# Patient Record
Sex: MALE | Race: WHITE | Employment: FULL TIME | URBAN - METROPOLITAN AREA
[De-identification: names, ages, dates, MRNs, and addresses within clinical notes are randomized per-mention and may not be internally consistent; named-entity substitution may affect disease eponyms.]

---

## 2017-10-13 ENCOUNTER — HOSPITAL ENCOUNTER (INPATIENT)
Facility: HOSPITAL | Age: 47
LOS: 3 days | Discharge: HOME | DRG: 638 | End: 2017-10-16
Attending: EMERGENCY MEDICINE | Admitting: INTERNAL MEDICINE
Payer: COMMERCIAL

## 2017-10-13 ENCOUNTER — APPOINTMENT (OUTPATIENT)
Dept: GENERAL RADIOLOGY | Facility: HOSPITAL | Age: 47
DRG: 638 | End: 2017-10-13
Attending: EMERGENCY MEDICINE
Payer: COMMERCIAL

## 2017-10-13 DIAGNOSIS — Z79.4 TYPE 2 DIABETES MELLITUS WITH HYPERGLYCEMIA, WITH LONG-TERM CURRENT USE OF INSULIN (HCC): ICD-10-CM

## 2017-10-13 DIAGNOSIS — R73.9 HYPERGLYCEMIA: Primary | ICD-10-CM

## 2017-10-13 DIAGNOSIS — J06.9 VIRAL URI: ICD-10-CM

## 2017-10-13 DIAGNOSIS — R50.9 FEVER, UNSPECIFIED FEVER CAUSE: ICD-10-CM

## 2017-10-13 DIAGNOSIS — E11.65 TYPE 2 DIABETES MELLITUS WITH HYPERGLYCEMIA, WITH LONG-TERM CURRENT USE OF INSULIN (HCC): ICD-10-CM

## 2017-10-13 PROBLEM — N17.9 ACUTE KIDNEY INJURY (HCC): Status: ACTIVE | Noted: 2017-10-13

## 2017-10-13 PROBLEM — E87.1 HYPONATREMIA: Status: ACTIVE | Noted: 2017-10-13

## 2017-10-13 PROCEDURE — 86788 WEST NILE VIRUS AB IGM: CPT | Performed by: FAMILY MEDICINE

## 2017-10-13 PROCEDURE — 82962 GLUCOSE BLOOD TEST: CPT

## 2017-10-13 PROCEDURE — 93010 ELECTROCARDIOGRAM REPORT: CPT | Performed by: INTERNAL MEDICINE

## 2017-10-13 PROCEDURE — 93005 ELECTROCARDIOGRAM TRACING: CPT

## 2017-10-13 PROCEDURE — 81001 URINALYSIS AUTO W/SCOPE: CPT | Performed by: EMERGENCY MEDICINE

## 2017-10-13 PROCEDURE — 96367 TX/PROPH/DG ADDL SEQ IV INF: CPT

## 2017-10-13 PROCEDURE — 80053 COMPREHEN METABOLIC PANEL: CPT | Performed by: EMERGENCY MEDICINE

## 2017-10-13 PROCEDURE — 87486 CHLMYD PNEUM DNA AMP PROBE: CPT | Performed by: EMERGENCY MEDICINE

## 2017-10-13 PROCEDURE — 71010 XR CHEST AP PORTABLE  (CPT=71010): CPT | Performed by: EMERGENCY MEDICINE

## 2017-10-13 PROCEDURE — 87086 URINE CULTURE/COLONY COUNT: CPT | Performed by: EMERGENCY MEDICINE

## 2017-10-13 PROCEDURE — 99291 CRITICAL CARE FIRST HOUR: CPT

## 2017-10-13 PROCEDURE — 99285 EMERGENCY DEPT VISIT HI MDM: CPT

## 2017-10-13 PROCEDURE — 87798 DETECT AGENT NOS DNA AMP: CPT | Performed by: EMERGENCY MEDICINE

## 2017-10-13 PROCEDURE — 83605 ASSAY OF LACTIC ACID: CPT | Performed by: EMERGENCY MEDICINE

## 2017-10-13 PROCEDURE — 96365 THER/PROPH/DIAG IV INF INIT: CPT

## 2017-10-13 PROCEDURE — 36415 COLL VENOUS BLD VENIPUNCTURE: CPT

## 2017-10-13 PROCEDURE — 87040 BLOOD CULTURE FOR BACTERIA: CPT | Performed by: EMERGENCY MEDICINE

## 2017-10-13 PROCEDURE — 87581 M.PNEUMON DNA AMP PROBE: CPT | Performed by: EMERGENCY MEDICINE

## 2017-10-13 PROCEDURE — 86789 WEST NILE VIRUS ANTIBODY: CPT | Performed by: FAMILY MEDICINE

## 2017-10-13 PROCEDURE — 82009 KETONE BODYS QUAL: CPT | Performed by: NURSE PRACTITIONER

## 2017-10-13 PROCEDURE — 87999 UNLISTED MICROBIOLOGY PX: CPT

## 2017-10-13 PROCEDURE — 87633 RESP VIRUS 12-25 TARGETS: CPT | Performed by: EMERGENCY MEDICINE

## 2017-10-13 PROCEDURE — 83036 HEMOGLOBIN GLYCOSYLATED A1C: CPT | Performed by: INTERNAL MEDICINE

## 2017-10-13 RX ORDER — ACETAMINOPHEN 325 MG/1
650 TABLET ORAL EVERY 6 HOURS PRN
Status: DISCONTINUED | OUTPATIENT
Start: 2017-10-13 | End: 2017-10-16

## 2017-10-13 RX ORDER — ONDANSETRON 2 MG/ML
4 INJECTION INTRAMUSCULAR; INTRAVENOUS EVERY 6 HOURS PRN
Status: DISCONTINUED | OUTPATIENT
Start: 2017-10-13 | End: 2017-10-16

## 2017-10-13 RX ORDER — SODIUM PHOSPHATE, DIBASIC AND SODIUM PHOSPHATE, MONOBASIC 7; 19 G/133ML; G/133ML
1 ENEMA RECTAL ONCE AS NEEDED
Status: DISCONTINUED | OUTPATIENT
Start: 2017-10-13 | End: 2017-10-16

## 2017-10-13 RX ORDER — HEPARIN SODIUM 5000 [USP'U]/ML
5000 INJECTION, SOLUTION INTRAVENOUS; SUBCUTANEOUS EVERY 8 HOURS SCHEDULED
Status: DISCONTINUED | OUTPATIENT
Start: 2017-10-13 | End: 2017-10-16

## 2017-10-13 RX ORDER — SODIUM CHLORIDE 9 MG/ML
INJECTION, SOLUTION INTRAVENOUS CONTINUOUS
Status: DISCONTINUED | OUTPATIENT
Start: 2017-10-13 | End: 2017-10-14

## 2017-10-13 RX ORDER — POLYETHYLENE GLYCOL 3350 17 G/17G
17 POWDER, FOR SOLUTION ORAL DAILY PRN
Status: DISCONTINUED | OUTPATIENT
Start: 2017-10-13 | End: 2017-10-16

## 2017-10-13 RX ORDER — BISACODYL 10 MG
10 SUPPOSITORY, RECTAL RECTAL
Status: DISCONTINUED | OUTPATIENT
Start: 2017-10-13 | End: 2017-10-16

## 2017-10-13 RX ORDER — DEXTROSE MONOHYDRATE 25 G/50ML
50 INJECTION, SOLUTION INTRAVENOUS
Status: DISCONTINUED | OUTPATIENT
Start: 2017-10-13 | End: 2017-10-16

## 2017-10-13 RX ORDER — DOCUSATE SODIUM 100 MG/1
100 CAPSULE, LIQUID FILLED ORAL 2 TIMES DAILY
Status: DISCONTINUED | OUTPATIENT
Start: 2017-10-13 | End: 2017-10-16

## 2017-10-13 RX ORDER — DEXTROSE AND SODIUM CHLORIDE 5; .45 G/100ML; G/100ML
100 INJECTION, SOLUTION INTRAVENOUS CONTINUOUS PRN
Status: DISCONTINUED | OUTPATIENT
Start: 2017-10-13 | End: 2017-10-14

## 2017-10-14 PROCEDURE — 87081 CULTURE SCREEN ONLY: CPT | Performed by: INTERNAL MEDICINE

## 2017-10-14 PROCEDURE — 83516 IMMUNOASSAY NONANTIBODY: CPT | Performed by: INTERNAL MEDICINE

## 2017-10-14 PROCEDURE — 82962 GLUCOSE BLOOD TEST: CPT

## 2017-10-14 PROCEDURE — 83605 ASSAY OF LACTIC ACID: CPT | Performed by: EMERGENCY MEDICINE

## 2017-10-14 PROCEDURE — 80048 BASIC METABOLIC PNL TOTAL CA: CPT | Performed by: HOSPITALIST

## 2017-10-14 PROCEDURE — 84681 ASSAY OF C-PEPTIDE: CPT | Performed by: INTERNAL MEDICINE

## 2017-10-14 PROCEDURE — 87493 C DIFF AMPLIFIED PROBE: CPT | Performed by: INTERNAL MEDICINE

## 2017-10-14 PROCEDURE — 84132 ASSAY OF SERUM POTASSIUM: CPT | Performed by: INTERNAL MEDICINE

## 2017-10-14 PROCEDURE — 84145 PROCALCITONIN (PCT): CPT | Performed by: NURSE PRACTITIONER

## 2017-10-14 PROCEDURE — 85027 COMPLETE CBC AUTOMATED: CPT | Performed by: HOSPITALIST

## 2017-10-14 RX ORDER — SODIUM CHLORIDE 450 MG/100ML
INJECTION, SOLUTION INTRAVENOUS CONTINUOUS
Status: DISCONTINUED | OUTPATIENT
Start: 2017-10-14 | End: 2017-10-16

## 2017-10-14 RX ORDER — POTASSIUM CHLORIDE 20 MEQ/1
40 TABLET, EXTENDED RELEASE ORAL EVERY 4 HOURS
Status: COMPLETED | OUTPATIENT
Start: 2017-10-14 | End: 2017-10-14

## 2017-10-14 RX ORDER — FAMOTIDINE 20 MG/1
20 TABLET ORAL 2 TIMES DAILY
Status: DISCONTINUED | OUTPATIENT
Start: 2017-10-14 | End: 2017-10-16

## 2017-10-14 NOTE — ED PROVIDER NOTES
Patient Seen in: [facility] Emergency Department    History   Patient presents with:  Abnormal Result (metabolic, cardiac)    Stated Complaint: abnormal blood results    HPI    Patient is a 67-year-old male presents for further evaluation in the wil stated in HPI.     Physical Exam   ED Triage Vitals [10/13/17 2035]  BP: 151/89  Pulse: 102  Resp: 18  Temp: 99.8 °F (37.7 °C)  Temp src: Temporal  SpO2: n/a  O2 Device: n/a    Current:/89   Pulse 102   Temp 99.8 °F (37.7 °C) (Temporal)   Resp 18   Ht viral process. Flu panel was expanded viruses was sent. Does have pyuria. Urine blood was sent for culture. Chemistry reviewed. Glucose 791. Creatinine 1.8. Sodium 244. Patient was hydrated with normal saline. He was put on an insulin drip.   He wi

## 2017-10-14 NOTE — PLAN OF CARE
Received pt this am aox4, low grade fever this am, monitoring. Pt received breakfast tray this am, also on insulin gtt. Will discuss with endo.      Family at bedside this am.     GASTROINTESTINAL - ADULT    • Maintains or returns to baseline bowel func

## 2017-10-14 NOTE — PROGRESS NOTES
ICU  Critical Care APN Progress Note    NAME: Mariya Louis - ROOM: 009/639-U - MRN: [id number] - Age: 50year old - :1/10/1970    History Of Present Illness:  Mariya Louis is a 50year old male with PMHx significant for HTN, DM, HLD, adrenal ad Acute kidney injury ([facility] 10/13/2017   • Adrenal adenoma 2011    L adrenal adenoma   • Diabetes mellitus ([facility] 1997   • HYPERLIPIDEMIA    • Hypertension        Social Hx:  Smoking status: Former Smoker insulin infusion & hypoglycemia protocol  - Additional IVF bolus now, and continuous rate after  - A1C, Acetone pending  - no anion gap elevation appreciated    2.  Fever 2/2 likely viral URI v UTI v GI source  - Respiratory viral panel sent  - Blood/Sputum

## 2017-10-14 NOTE — PROGRESS NOTES
Patient arrived on unit. Settled into room. Dinner eaten in ICU prior to arrival. Blood sugars covered per [facility] report.

## 2017-10-14 NOTE — CONSULTS
[facility]    PATIENT'S NAME: Sim Romo   ATTENDING PHYSICIAN: Vaughn Vasquez M.D.   CONSULTING PHYSICIAN: Geno Cedeño M.D.    PATIENT ACCOUNT#:   [de-identified]    LOCATION:  [address] [place] [address]  MEDICAL RECORD #:   [id number]       DATE OF workup, hypertension, hyperlipidemia. He has had back surgery in the past.     MEDICATIONS:  On admission, none, other than Augmentin.   He was on pioglitazone 30 mg at one point, glimepiride 2 mg, and metformin 1000 b.i.d. prior to discontinuing his own m Primary Service. 3.   Hypertension. Management per Primary Service. 4.   Hyperlipidemia, previously on statin. It is likely that he will require this agent at the time of discharge.       Dictated By Santana Zamora M.D.  d: 10/14/2017 12:03:13

## 2017-10-14 NOTE — H&P
.  CC: Patient presents with:  Abnormal Result (metabolic, cardiac)       PCP: Eloise Reynolds MD    History of Present Illness: Patient is a 50year old male with PMH sig for DM, HTN/HL who has most recently not been on diabetes meds, who presented with abn l times daily.  (Patient taking differently: Take 1 tablet by mouth 2 (two) times daily.  ) Disp: 20 tablet Rfl: 0         Soc Hx     Smoking status: Former Smoker  1.50 Packs/day  For 27.00 Years     Types: Cigarettes    Quit date: 10/13/2012    Smokeless to Patient complains of fever, chills, diarrhea. Patient states he was sent here after abnormal blood lab results. FINDINGS:  Lung volumes are upper normal. No new consolidation or pleural effusion. Heart and pulmonary vessels are normal caliber.  Mediastin

## 2017-10-14 NOTE — PROGRESS NOTES
[facility] PULMONARY/CRITICAL CARE CONSULTATION    HPI: Stephan Bell is a 50year old male with PMHx significant for HTN, DM, HLD, adrenal adenoma, who presents to ED today from home for hyperglycemia & elevated BUN/sCr per instructions from his PCP office. Hypertension      Past Surgical History:  2007: BACK SURGERY      Comment: cervical fusion  7/18/2014: EXC BACK LES SC = 3 CM Right      Comment: Procedure: EXCISION OF TRUNK MASS;  Surgeon:                Janeth Herman MD;  Location: [facility] SURGICAL DAILY.       Facility-Administered Medications: None         Current Meds:    Current Facility-Administered Medications:  Potassium Chloride ER (K-DUR M20) CR tab 40 mEq 40 mEq Oral Q4H   0.9%  NaCl infusion  Intravenous Continuous   Heparin Sodium (Porcine Not on file     Other Topics Concern   None on file     Social History Narrative   None on file       History reviewed. No pertinent family history.     ROS: 10 pt ROS negative except what is mentioned in HPI    OBJECTIVE:   10/14/17  0200 10/14/17  0220 10 PGLU 160 10/14/2017     No results for input(s): TROP, CK in the last 72 hours. Imaging -- CXR -- reviewed and visualized -- no acute cardiopulm abn    Assessment and Plan:  1.  Hyperglycemia -- secondary to medication non-compliance with likely acute

## 2017-10-14 NOTE — ED INITIAL ASSESSMENT (HPI)
Fever and malaise since Monday, ST since yesterday. Saw PCP today for strep and mono tests, which patient reports were negative.  Patient reports his blood sugar has been running in the 600s

## 2017-10-14 NOTE — PLAN OF CARE
Patient/Family Goals    • Patient/Family Short Term Goal Completed          Diabetes/Glucose Control    • Glucose maintained within prescribed range Progressing        GASTROINTESTINAL - ADULT    • Minimal or absence of nausea and vomiting Progressing    •

## 2017-10-15 PROCEDURE — 82962 GLUCOSE BLOOD TEST: CPT

## 2017-10-15 PROCEDURE — 84132 ASSAY OF SERUM POTASSIUM: CPT | Performed by: HOSPITALIST

## 2017-10-15 PROCEDURE — 80053 COMPREHEN METABOLIC PANEL: CPT | Performed by: INTERNAL MEDICINE

## 2017-10-15 NOTE — PROGRESS NOTES
[facility]  Progress Note    Berto Jarvis Patient Status:  Inpatient    1/10/1970 MRN [id number]   [facility] 3SW-A Attending Brenton Raman, *   Hosp Day # 2 PCP Dunia Jay MD     Assessment/Plan:  Patient Active Problem (80.7 kg), SpO2 96 %. Intake/Output Summary (Last 24 hours) at 10/15/17 1143  Last data filed at 10/15/17 1132   Gross per 24 hour   Intake             3266 ml   Output             1105 ml   Net             2161 ml       HEENT: Exam is unremarkable.   Ne

## 2017-10-15 NOTE — PROGRESS NOTES
[facility] Hospitalist Progress Note     PCP: Trent Melvin MD    CC:  Follow up    SUBJECTIVE:  Pt sitting up in chair, says he feels great.   No cp/sob/n/v     OBJECTIVE:  Temp:  [97.5 °F (36.4 °C)-102 °F (38.9 °C)] 98.9 °F (37.2 °C)  Pulse:  [59-82] 75  Resp: famoTIDine  20 mg Oral BID   • Insulin Aspart Pen  1-30 Units Subcutaneous TID CC and HS   • Insulin Aspart Pen  1-30 Units Subcutaneous TID CC and HS   • insulin detemir  10 Units Subcutaneous Daily   • insulin detemir  10 Units Subcutaneous Nightly   • H

## 2017-10-16 VITALS
HEIGHT: 66 IN | TEMPERATURE: 99 F | SYSTOLIC BLOOD PRESSURE: 130 MMHG | WEIGHT: 178 LBS | BODY MASS INDEX: 28.61 KG/M2 | DIASTOLIC BLOOD PRESSURE: 75 MMHG | RESPIRATION RATE: 18 BRPM | OXYGEN SATURATION: 97 % | HEART RATE: 74 BPM

## 2017-10-16 PROCEDURE — 82962 GLUCOSE BLOOD TEST: CPT

## 2017-10-16 PROCEDURE — 83735 ASSAY OF MAGNESIUM: CPT | Performed by: HOSPITALIST

## 2017-10-16 PROCEDURE — 80048 BASIC METABOLIC PNL TOTAL CA: CPT | Performed by: HOSPITALIST

## 2017-10-16 RX ORDER — MAGNESIUM OXIDE 400 MG (241.3 MG MAGNESIUM) TABLET
400 TABLET ONCE
Status: COMPLETED | OUTPATIENT
Start: 2017-10-16 | End: 2017-10-16

## 2017-10-16 NOTE — PLAN OF CARE
D/c paperwork given, scripts given and some sent to pharmacy. Pt's questions answered and concerns addressed by RN and diabetes educator. Will D/c home via personal car.

## 2017-10-16 NOTE — PLAN OF CARE
Diabetes/Glucose Control    • Glucose maintained within prescribed range Adequate for Discharge        METABOLIC/FLUID AND ELECTROLYTES - ADULT    • Glucose maintained within prescribed range Adequate for Discharge        PAIN - ADULT    • Verbalizes/displ

## 2017-10-16 NOTE — PLAN OF CARE
Diabetes/Glucose Control    • Glucose maintained within prescribed range Adequate for Discharge        PAIN - ADULT    • Verbalizes/displays adequate comfort level or patient's stated pain goal Adequate for Discharge          GASTROINTESTINAL - ADULT    •

## 2017-10-16 NOTE — DISCHARGE SUMMARY
General Medicine Discharge Summary     Patient ID:  Guy Do  50year old  [id number]  1/10/1970    Admit date: 10/13/2017    Discharge date and time: 10/16/17    Attending Physician: Yadira Plascencia, *     Primary Care Physician: Destini Burgos to f/u if he was not better. He came back in yesterday and labs were checked. His doctor's office informed him that his glc was 688 and he was urged to go to ER. He was started on insulin gtt in ICU. He currently feels much better.   He had lost substantial imaging of lower cervical plate and screw fixation. Mild scoliosis, apex left. CONCLUSION:  No acute cardiopulmonary disease.     Dictated by: Ed Clinton MD on 10/13/2017 at 20:49     Approved by: Ed Clinton MD              Operative Procedures: Please discuss with provider.       STOP taking these medications    Amoxicillin-Pot Clavulanate (AUGMENTIN) 875-125 MG Oral Tab    PIOGLITAZONE HCL 30 MG Oral Tab    glimepiride 2 MG Oral Tab    MetFORMIN HCl 1000 MG Oral Tab          Home Medication Heath Alvarez

## 2017-10-16 NOTE — PROGRESS NOTES
Patient blood sugar at 1630 was 251. Meter did not cross lab value over. Patient covered accordingly per correction and carb count insulin scales for a total of 12 units.

## 2017-10-16 NOTE — CONSULTS
[facility]  Diabetes Clinical Nurse Specialist Consult Note    Atiya Delgado Patient Status:  Inpatient    1/10/1970 MRN [id number]   [facility] 3SW-A Attending Vinicio Shelby, [facility] Day # 3 PCP MD Farida Feng counseling patient regarding diet, medications, side effects, treatment options, discharge planning and coordination of care.      Education Provided:    · \"Understanding Diabetes: Basic Survival Skills\" booklet given  · [employer] d

## 2017-10-16 NOTE — DIETARY NOTE
Nutrition short note    Received consult for carb counting teaching. Pt reports changing his diet to Paleo diet and lost >50 pounds, and initially reports blood sugars and they were in the 80 and 90's and then stopped checking bs.     Pt reports he was edu

## 2017-10-16 NOTE — PROGRESS NOTES
[facility]  Progress Note    Mariya Louis Patient Status:  Inpatient    1/10/1970 MRN [id number]   [facility] 3SW-A Attending Verena Carreno, *   Hosp Day # 3 PCP Marlee Osler, MD     Assessment/Plan:  Patient Active Problem SpO2 97 %. Intake/Output Summary (Last 24 hours) at 10/16/17 0657  Last data filed at 10/16/17 0600   Gross per 24 hour   Intake             3492 ml   Output             1525 ml   Net             1967 ml       HEENT: Exam is unremarkable. Neck: Supple.

## 2017-10-20 NOTE — PROGRESS NOTES
[phone] (home) [phone] (work)  [facility] regarding Dr. Chinedu Motta result note. Hours and number given.

## 2019-01-18 PROBLEM — Z98.1 HISTORY OF FUSION OF CERVICAL SPINE: Status: ACTIVE | Noted: 2019-01-18

## 2019-01-18 PROBLEM — M48.02 FORAMINAL STENOSIS OF CERVICAL REGION: Status: ACTIVE | Noted: 2019-01-18

## 2019-03-03 ENCOUNTER — APPOINTMENT (OUTPATIENT)
Dept: GENERAL RADIOLOGY | Age: 49
End: 2019-03-03
Attending: EMERGENCY MEDICINE
Payer: COMMERCIAL

## 2019-03-03 ENCOUNTER — HOSPITAL ENCOUNTER (EMERGENCY)
Age: 49
Discharge: HOME | End: 2019-03-03
Attending: EMERGENCY MEDICINE
Payer: COMMERCIAL

## 2019-03-03 VITALS
RESPIRATION RATE: 18 BRPM | SYSTOLIC BLOOD PRESSURE: 149 MMHG | TEMPERATURE: 99 F | WEIGHT: 229.94 LBS | HEART RATE: 80 BPM | DIASTOLIC BLOOD PRESSURE: 76 MMHG | BODY MASS INDEX: 35 KG/M2 | OXYGEN SATURATION: 97 %

## 2019-03-03 DIAGNOSIS — J11.1 INFLUENZA: ICD-10-CM

## 2019-03-03 DIAGNOSIS — N30.00 ACUTE CYSTITIS WITHOUT HEMATURIA: Primary | ICD-10-CM

## 2019-03-03 LAB
ALBUMIN SERPL-MCNC: 3.5 G/DL (ref 3.4–5)
ALBUMIN/GLOB SERPL: 1 {RATIO} (ref 1–2)
ALP LIVER SERPL-CCNC: 87 U/L (ref 45–117)
ALT SERPL-CCNC: 69 U/L (ref 16–61)
ANION GAP SERPL CALC-SCNC: 7 MMOL/L (ref 0–18)
AST SERPL-CCNC: 28 U/L (ref 15–37)
BASOPHILS # BLD AUTO: 0.04 X10(3) UL (ref 0–0.2)
BASOPHILS NFR BLD AUTO: 0.6 %
BILIRUB SERPL-MCNC: 0.5 MG/DL (ref 0.1–2)
BILIRUB UR QL STRIP.AUTO: NEGATIVE
BUN BLD-MCNC: 19 MG/DL (ref 7–18)
BUN/CREAT SERPL: 14.7 (ref 10–20)
CALCIUM BLD-MCNC: 8.8 MG/DL (ref 8.5–10.1)
CHLORIDE SERPL-SCNC: 106 MMOL/L (ref 98–107)
CO2 SERPL-SCNC: 24 MMOL/L (ref 21–32)
COLOR UR AUTO: YELLOW
CREAT BLD-MCNC: 1.29 MG/DL (ref 0.7–1.3)
DEPRECATED RDW RBC AUTO: 40.8 FL (ref 35.1–46.3)
EOSINOPHIL # BLD AUTO: 0.08 X10(3) UL (ref 0–0.7)
EOSINOPHIL NFR BLD AUTO: 1.3 %
ERYTHROCYTE [DISTWIDTH] IN BLOOD BY AUTOMATED COUNT: 12.9 % (ref 11–15)
GLOBULIN PLAS-MCNC: 3.5 G/DL (ref 2.8–4.4)
GLUCOSE BLD-MCNC: 113 MG/DL (ref 70–99)
GLUCOSE BLD-MCNC: 184 MG/DL (ref 70–99)
GLUCOSE BLD-MCNC: 195 MG/DL (ref 70–99)
GLUCOSE UR STRIP.AUTO-MCNC: NEGATIVE MG/DL
HCT VFR BLD AUTO: 42.9 % (ref 39–53)
HGB BLD-MCNC: 14.3 G/DL (ref 13–17.5)
IMM GRANULOCYTES # BLD AUTO: 0.02 X10(3) UL (ref 0–1)
IMM GRANULOCYTES NFR BLD: 0.3 %
KETONES UR STRIP.AUTO-MCNC: NEGATIVE MG/DL
LACTATE SERPL-SCNC: 0.9 MMOL/L (ref 0.4–2)
LYMPHOCYTES # BLD AUTO: 0.84 X10(3) UL (ref 1–4)
LYMPHOCYTES NFR BLD AUTO: 13.6 %
M PROTEIN MFR SERPL ELPH: 7 G/DL (ref 6.4–8.2)
MCH RBC QN AUTO: 29.1 PG (ref 26–34)
MCHC RBC AUTO-ENTMCNC: 33.3 G/DL (ref 31–37)
MCV RBC AUTO: 87.4 FL (ref 80–100)
MONOCYTES # BLD AUTO: 0.74 X10(3) UL (ref 0.1–1)
MONOCYTES NFR BLD AUTO: 12 %
NEUTROPHILS # BLD AUTO: 4.47 X10 (3) UL (ref 1.5–7.7)
NEUTROPHILS # BLD AUTO: 4.47 X10(3) UL (ref 1.5–7.7)
NEUTROPHILS NFR BLD AUTO: 72.2 %
NITRITE UR QL STRIP.AUTO: POSITIVE
OSMOLALITY SERPL CALC.SUM OF ELEC: 291 MOSM/KG (ref 275–295)
PH UR STRIP.AUTO: 6 [PH] (ref 4.5–8)
PLATELET # BLD AUTO: 218 10(3)UL (ref 150–450)
POCT INFLUENZA A: POSITIVE
POCT INFLUENZA B: NEGATIVE
POTASSIUM SERPL-SCNC: 4 MMOL/L (ref 3.5–5.1)
PROT UR STRIP.AUTO-MCNC: NEGATIVE MG/DL
RBC # BLD AUTO: 4.91 X10(6)UL (ref 4.3–5.7)
RBC UR QL AUTO: NEGATIVE
SODIUM SERPL-SCNC: 137 MMOL/L (ref 136–145)
SP GR UR STRIP.AUTO: 1.01 (ref 1–1.03)
UROBILINOGEN UR STRIP.AUTO-MCNC: 0.2 MG/DL
WBC # BLD AUTO: 6.2 X10(3) UL (ref 4–11)
WBC #/AREA URNS AUTO: >50 /HPF

## 2019-03-03 PROCEDURE — 87502 INFLUENZA DNA AMP PROBE: CPT | Performed by: EMERGENCY MEDICINE

## 2019-03-03 PROCEDURE — 36415 COLL VENOUS BLD VENIPUNCTURE: CPT

## 2019-03-03 PROCEDURE — 99284 EMERGENCY DEPT VISIT MOD MDM: CPT

## 2019-03-03 PROCEDURE — 80053 COMPREHEN METABOLIC PANEL: CPT | Performed by: EMERGENCY MEDICINE

## 2019-03-03 PROCEDURE — 81001 URINALYSIS AUTO W/SCOPE: CPT | Performed by: EMERGENCY MEDICINE

## 2019-03-03 PROCEDURE — 71046 X-RAY EXAM CHEST 2 VIEWS: CPT | Performed by: EMERGENCY MEDICINE

## 2019-03-03 PROCEDURE — 87086 URINE CULTURE/COLONY COUNT: CPT | Performed by: EMERGENCY MEDICINE

## 2019-03-03 PROCEDURE — 96365 THER/PROPH/DIAG IV INF INIT: CPT

## 2019-03-03 PROCEDURE — 82962 GLUCOSE BLOOD TEST: CPT

## 2019-03-03 PROCEDURE — 85025 COMPLETE CBC W/AUTO DIFF WBC: CPT | Performed by: EMERGENCY MEDICINE

## 2019-03-03 PROCEDURE — 87077 CULTURE AEROBIC IDENTIFY: CPT | Performed by: EMERGENCY MEDICINE

## 2019-03-03 PROCEDURE — 83605 ASSAY OF LACTIC ACID: CPT | Performed by: EMERGENCY MEDICINE

## 2019-03-03 PROCEDURE — 87040 BLOOD CULTURE FOR BACTERIA: CPT | Performed by: EMERGENCY MEDICINE

## 2019-03-03 PROCEDURE — 96361 HYDRATE IV INFUSION ADD-ON: CPT

## 2019-03-03 RX ORDER — CEPHALEXIN 500 MG/1
500 CAPSULE ORAL 4 TIMES DAILY
Qty: 28 CAPSULE | Refills: 0 | Status: SHIPPED | OUTPATIENT
Start: 2019-03-03 | End: 2019-03-10

## 2019-03-03 RX ORDER — OSELTAMIVIR PHOSPHATE 75 MG/1
75 CAPSULE ORAL 2 TIMES DAILY
Qty: 10 CAPSULE | Refills: 0 | Status: SHIPPED | OUTPATIENT
Start: 2019-03-03 | End: 2019-03-08

## 2019-03-03 RX ORDER — ACETAMINOPHEN 500 MG
1000 TABLET ORAL ONCE
Status: COMPLETED | OUTPATIENT
Start: 2019-03-03 | End: 2019-03-03

## 2019-03-04 NOTE — ED PROVIDER NOTES
Patient Seen in: THE [facility] Emergency Department In [place]    History   Patient presents with:  Flu  Hyperglycemia (metabolic)    Stated Complaint: flu-like symptoms with elevated blood sugars, hx DM2    HPI    56-year-old white male who presents emerged Cervical fusion           Social History    Tobacco Use      Smoking status: Former Smoker        Packs/day: 1.50        Years: 27.00        Pack years: 40.5        Types: Cigarettes        Quit date: 10/13/2012        Years since quittin.3      Smokel All other components within normal limits   URINALYSIS WITH CULTURE REFLEX - Abnormal; Notable for the following components:    Clarity Urine Slightly Cloudy (*)     Nitrite Urine Positive (*)     Leukocyte Esterase Urine Large (*)     All other components 10/13/2017 chest radiograph, allowing for differences in technique.                MDM   Patient had an IV established here in the emergency was given IV fluids and antipyretics and was observed.   His laboratory workup revealed that he tested positive for

## 2019-06-20 PROCEDURE — 88305 TISSUE EXAM BY PATHOLOGIST: CPT | Performed by: INTERNAL MEDICINE

## 2021-02-26 PROBLEM — M54.12 CERVICAL RADICULITIS: Status: ACTIVE | Noted: 2021-02-26

## 2023-10-02 ENCOUNTER — APPOINTMENT (OUTPATIENT)
Dept: CT IMAGING | Age: 53
End: 2023-10-02
Attending: EMERGENCY MEDICINE

## 2023-10-02 ENCOUNTER — HOSPITAL ENCOUNTER (EMERGENCY)
Age: 53
Discharge: HOME | End: 2023-10-02
Attending: EMERGENCY MEDICINE

## 2023-10-02 VITALS
HEART RATE: 60 BPM | OXYGEN SATURATION: 98 % | WEIGHT: 213.85 LBS | HEIGHT: 70 IN | BODY MASS INDEX: 30.61 KG/M2 | DIASTOLIC BLOOD PRESSURE: 91 MMHG | TEMPERATURE: 99 F | RESPIRATION RATE: 14 BRPM | SYSTOLIC BLOOD PRESSURE: 149 MMHG

## 2023-10-02 DIAGNOSIS — S20.229A CONTUSION OF BACK, UNSPECIFIED LATERALITY, INITIAL ENCOUNTER: ICD-10-CM
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
## 2023-10-02 PROCEDURE — 10002803 HB RX 637: Performed by: EMERGENCY MEDICINE

## 2023-10-02 PROCEDURE — 99284 EMERGENCY DEPT VISIT MOD MDM: CPT

## 2023-10-02 PROCEDURE — 72131 CT LUMBAR SPINE W/O DYE: CPT

## 2023-10-02 PROCEDURE — G1004 CDSM NDSC: HCPCS

## 2023-10-02 PROCEDURE — 70450 CT HEAD/BRAIN W/O DYE: CPT

## 2023-10-02 PROCEDURE — 10004651 HB RX, NO CHARGE ITEM: Performed by: EMERGENCY MEDICINE

## 2023-10-02 PROCEDURE — 72125 CT NECK SPINE W/O DYE: CPT

## 2023-10-02 PROCEDURE — 72128 CT CHEST SPINE W/O DYE: CPT

## 2023-10-02 RX ORDER — INSULIN GLARGINE-YFGN 100 [IU]/ML
47 INJECTION, SOLUTION SUBCUTANEOUS
COMMUNITY
Start: 2023-09-18

## 2023-10-02 RX ORDER — ACETAMINOPHEN 500 MG
1000 TABLET ORAL ONCE
Status: COMPLETED | OUTPATIENT
Start: 2023-10-02 | End: 2023-10-02

## 2023-10-02 RX ORDER — LOSARTAN POTASSIUM 100 MG/1
1 TABLET ORAL DAILY
COMMUNITY
Start: 2023-05-15

## 2023-10-02 RX ORDER — IBUPROFEN 600 MG/1
600 TABLET ORAL ONCE
Status: COMPLETED | OUTPATIENT
Start: 2023-10-02 | End: 2023-10-02

## 2023-10-02 RX ADMIN — ACETAMINOPHEN 1000 MG: 500 TABLET ORAL at 06:53

## 2023-10-02 RX ADMIN — IBUPROFEN 600 MG: 600 TABLET, FILM COATED ORAL at 08:33

## (undated) NOTE — ED AVS SNAPSHOT
Beth Gonzalez   MRN: [id number]    Department:  [facility] Emergency Department in [place]   Date of Visit:  3/3/2019           Disclosure     Insurance plans vary and the physician(s) referred by the ER may not be covered by your plan.  Please conta tell this physician (or your personal doctor if your instructions are to return to your personal doctor) about any new or lasting problems. The primary care or specialist physician will see patients referred from the [facility] Emergency Department.  Melecio Lomeli

## (undated) NOTE — LETTER
Date & Time: 3/3/2019, 10:43 PM  Patient: Lubna Hands  Encounter Provider(s):    Toby Baker MD       To Whom It May Concern:    Deana Paul was seen and treated in our department on 3/3/2019.  He should not return to work until Wednesday